# Patient Record
Sex: FEMALE | Race: WHITE | NOT HISPANIC OR LATINO
[De-identification: names, ages, dates, MRNs, and addresses within clinical notes are randomized per-mention and may not be internally consistent; named-entity substitution may affect disease eponyms.]

---

## 2017-02-25 ENCOUNTER — RX RENEWAL (OUTPATIENT)
Age: 27
End: 2017-02-25

## 2023-07-22 ENCOUNTER — LABORATORY RESULT (OUTPATIENT)
Age: 33
End: 2023-07-22

## 2023-07-22 ENCOUNTER — ASOB RESULT (OUTPATIENT)
Age: 33
End: 2023-07-22

## 2023-07-22 ENCOUNTER — APPOINTMENT (OUTPATIENT)
Dept: ANTEPARTUM | Facility: CLINIC | Age: 33
End: 2023-07-22
Payer: COMMERCIAL

## 2023-07-22 PROCEDURE — 76813 OB US NUCHAL MEAS 1 GEST: CPT

## 2023-07-22 PROCEDURE — 76801 OB US < 14 WKS SINGLE FETUS: CPT

## 2023-07-22 PROCEDURE — 36416 COLLJ CAPILLARY BLOOD SPEC: CPT

## 2023-09-05 ENCOUNTER — ASOB RESULT (OUTPATIENT)
Age: 33
End: 2023-09-05

## 2023-09-05 ENCOUNTER — APPOINTMENT (OUTPATIENT)
Dept: ANTEPARTUM | Facility: CLINIC | Age: 33
End: 2023-09-05
Payer: COMMERCIAL

## 2023-09-05 PROCEDURE — 76805 OB US >/= 14 WKS SNGL FETUS: CPT

## 2023-09-19 ENCOUNTER — TRANSCRIPTION ENCOUNTER (OUTPATIENT)
Age: 33
End: 2023-09-19

## 2024-01-09 ENCOUNTER — OUTPATIENT (OUTPATIENT)
Dept: INPATIENT UNIT | Facility: HOSPITAL | Age: 34
LOS: 1 days | Discharge: HOME | End: 2024-01-09

## 2024-01-09 ENCOUNTER — INPATIENT (INPATIENT)
Facility: HOSPITAL | Age: 34
LOS: 2 days | Discharge: HOME | End: 2024-01-12
Attending: STUDENT IN AN ORGANIZED HEALTH CARE EDUCATION/TRAINING PROGRAM | Admitting: STUDENT IN AN ORGANIZED HEALTH CARE EDUCATION/TRAINING PROGRAM

## 2024-01-09 ENCOUNTER — ASOB RESULT (OUTPATIENT)
Age: 34
End: 2024-01-09

## 2024-01-09 ENCOUNTER — APPOINTMENT (OUTPATIENT)
Dept: ANTEPARTUM | Facility: CLINIC | Age: 34
End: 2024-01-09
Payer: COMMERCIAL

## 2024-01-09 VITALS — TEMPERATURE: 98 F | DIASTOLIC BLOOD PRESSURE: 63 MMHG | HEART RATE: 106 BPM | SYSTOLIC BLOOD PRESSURE: 111 MMHG

## 2024-01-09 VITALS
SYSTOLIC BLOOD PRESSURE: 365 MMHG | DIASTOLIC BLOOD PRESSURE: 83 MMHG | RESPIRATION RATE: 100 BRPM | TEMPERATURE: 98 F | HEART RATE: 43 BPM

## 2024-01-09 VITALS
RESPIRATION RATE: 17 BRPM | HEART RATE: 107 BPM | SYSTOLIC BLOOD PRESSURE: 121 MMHG | TEMPERATURE: 98 F | DIASTOLIC BLOOD PRESSURE: 68 MMHG

## 2024-01-09 DIAGNOSIS — Z98.890 OTHER SPECIFIED POSTPROCEDURAL STATES: Chronic | ICD-10-CM

## 2024-01-09 DIAGNOSIS — O26.899 OTHER SPECIFIED PREGNANCY RELATED CONDITIONS, UNSPECIFIED TRIMESTER: ICD-10-CM

## 2024-01-09 LAB
HCT VFR BLD CALC: 38.8 % — SIGNIFICANT CHANGE UP (ref 34.5–45)
HCT VFR BLD CALC: 38.8 % — SIGNIFICANT CHANGE UP (ref 34.5–45)
HGB BLD-MCNC: 13.1 G/DL — SIGNIFICANT CHANGE UP (ref 11.5–15.5)
HGB BLD-MCNC: 13.1 G/DL — SIGNIFICANT CHANGE UP (ref 11.5–15.5)
IANC: 20.72 K/UL — HIGH (ref 1.8–7.4)
IANC: 20.72 K/UL — HIGH (ref 1.8–7.4)
MCHC RBC-ENTMCNC: 29.8 PG — SIGNIFICANT CHANGE UP (ref 27–34)
MCHC RBC-ENTMCNC: 29.8 PG — SIGNIFICANT CHANGE UP (ref 27–34)
MCHC RBC-ENTMCNC: 33.8 GM/DL — SIGNIFICANT CHANGE UP (ref 32–36)
MCHC RBC-ENTMCNC: 33.8 GM/DL — SIGNIFICANT CHANGE UP (ref 32–36)
MCV RBC AUTO: 88.2 FL — SIGNIFICANT CHANGE UP (ref 80–100)
MCV RBC AUTO: 88.2 FL — SIGNIFICANT CHANGE UP (ref 80–100)
PLATELET # BLD AUTO: 281 K/UL — SIGNIFICANT CHANGE UP (ref 150–400)
PLATELET # BLD AUTO: 281 K/UL — SIGNIFICANT CHANGE UP (ref 150–400)
RBC # BLD: 4.4 M/UL — SIGNIFICANT CHANGE UP (ref 3.8–5.2)
RBC # BLD: 4.4 M/UL — SIGNIFICANT CHANGE UP (ref 3.8–5.2)
RBC # FLD: 12.3 % — SIGNIFICANT CHANGE UP (ref 10.3–14.5)
RBC # FLD: 12.3 % — SIGNIFICANT CHANGE UP (ref 10.3–14.5)
WBC # BLD: 24.46 K/UL — HIGH (ref 3.8–10.5)
WBC # BLD: 24.46 K/UL — HIGH (ref 3.8–10.5)
WBC # FLD AUTO: 24.46 K/UL — HIGH (ref 3.8–10.5)
WBC # FLD AUTO: 24.46 K/UL — HIGH (ref 3.8–10.5)

## 2024-01-09 PROCEDURE — 76819 FETAL BIOPHYS PROFIL W/O NST: CPT | Mod: 26

## 2024-01-09 RX ORDER — CITRIC ACID/SODIUM CITRATE 300-500 MG
15 SOLUTION, ORAL ORAL EVERY 6 HOURS
Refills: 0 | Status: DISCONTINUED | OUTPATIENT
Start: 2024-01-09 | End: 2024-01-10

## 2024-01-09 RX ORDER — SODIUM CHLORIDE 9 MG/ML
1000 INJECTION, SOLUTION INTRAVENOUS ONCE
Refills: 0 | Status: DISCONTINUED | OUTPATIENT
Start: 2024-01-09 | End: 2024-01-10

## 2024-01-09 RX ORDER — OXYTOCIN 10 UNIT/ML
333.33 VIAL (ML) INJECTION
Qty: 20 | Refills: 0 | Status: DISCONTINUED | OUTPATIENT
Start: 2024-01-09 | End: 2024-01-10

## 2024-01-09 RX ORDER — SODIUM CHLORIDE 9 MG/ML
500 INJECTION, SOLUTION INTRAVENOUS ONCE
Refills: 0 | Status: DISCONTINUED | OUTPATIENT
Start: 2024-01-09 | End: 2024-01-10

## 2024-01-09 RX ORDER — CHLORHEXIDINE GLUCONATE 213 G/1000ML
1 SOLUTION TOPICAL DAILY
Refills: 0 | Status: DISCONTINUED | OUTPATIENT
Start: 2024-01-09 | End: 2024-01-10

## 2024-01-09 RX ORDER — SODIUM CHLORIDE 9 MG/ML
1000 INJECTION, SOLUTION INTRAVENOUS
Refills: 0 | Status: DISCONTINUED | OUTPATIENT
Start: 2024-01-09 | End: 2024-01-10

## 2024-01-09 NOTE — OB RN TRIAGE NOTE - FALL HARM RISK - UNIVERSAL INTERVENTIONS
Bed in lowest position, wheels locked, appropriate side rails in place/Call bell, personal items and telephone in reach/Instruct patient to call for assistance before getting out of bed or chair/Non-slip footwear when patient is out of bed/[place] to call system/Physically safe environment - no spills, clutter or unnecessary equipment/Purposeful Proactive Rounding/Room/bathroom lighting operational, light cord in reach Bed in lowest position, wheels locked, appropriate side rails in place/Call bell, personal items and telephone in reach/Instruct patient to call for assistance before getting out of bed or chair/Non-slip footwear when patient is out of bed/[place] to call system/Physically safe environment - no spills, clutter or unnecessary equipment/Purposeful Proactive Rounding/Room/bathroom lighting operational, light cord in reach

## 2024-01-09 NOTE — OB PROVIDER TRIAGE NOTE - ADDITIONAL INSTRUCTIONS
32yo  at 37+4 presenting to ob triage for rule out labor.  Pt. to go home and ambulate, educated on nipple stimulation, stretching and other techniques to assist in anticipated cervical change, pt. verbalizes understanding.  Pt. educated on trigger warnings on when to return to ob triage, pt. verbalizes understanding.  Pt. clinically stable for discharge home.  Discussed with Dr. Giron

## 2024-01-09 NOTE — OB PROVIDER H&P - ASSESSMENT
32yo  at 37+4 labor and rupture of membrane.  2315 discuss with Dr. Hogue  Patient admitted for early labor and rupture of membranes  For expectant management at this time  For epi PRN  routine orders  meds ordered  GBS negative  consents signed by patient.  2340 discuss with Dr. Pizarro PGY-3    ESTHER Martinez NP 34yo  at 37+4 labor and rupture of membrane.  2315 discuss with Dr. Hogue  Patient admitted for early labor and rupture of membranes  For expectant management at this time  For epi PRN  routine orders  meds ordered  GBS negative  consents signed by patient.  2340 discuss with Dr. Pizarro PGY-3    ESTHER Martinez NP

## 2024-01-09 NOTE — OB PROVIDER TRIAGE NOTE - HISTORY OF PRESENT ILLNESS
34yo  at 37+4 presenting to ob triage for complaint of contractions that had increase to 10.10 since 9PM and vaginal leakage of fluid since . 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

## 2024-01-09 NOTE — OB PROVIDER TRIAGE NOTE - HISTORY OF PRESENT ILLNESS
32yo  at 37+4 presenting to ob triage for rule out labor. Pt. denies decreased fetal movement, visualization of blood, fluid, fever, cough, chills, so, palpitations, n/v.

## 2024-01-09 NOTE — OB PROVIDER TRIAGE NOTE - NSOBPROVIDERNOTE_OBGYN_ALL_OB_FT
32yo  at 37+4 labor and rupture of membrane.  [id number] discuss with Dr. Hogue  Patient admitted for early labor and rupture of membranes  For expectant management at this time  For epi PRN  routine orders  meds ordered  GBS negative  consents signed by patient.    ESTHER Martinez NP 32yo  at 37+4 labor and rupture of membrane.  9513 discuss with Dr. Hogue  Patient admitted for early labor and rupture of membranes  For expectant management at this time  For epi PRN  routine orders  meds ordered  GBS negative  consents signed by patient.    ESTHER Martinez NP

## 2024-01-09 NOTE — OB RN TRIAGE NOTE - NSICDXPASTSURGICALHX_GEN_ALL_CORE_FT
PAST SURGICAL HISTORY:  H/O breast biopsy     History of arthroscopy of hip     History of surgical removal of pilonidal cyst

## 2024-01-09 NOTE — OB RN TRIAGE NOTE - FALL HARM RISK - UNIVERSAL INTERVENTIONS
Bed in lowest position, wheels locked, appropriate side rails in place/Call bell, personal items and telephone in reach/Instruct patient to call for assistance before getting out of bed or chair/Non-slip footwear when patient is out of bed/[place] to call system/Physically safe environment - no spills, clutter or unnecessary equipment/Purposeful Proactive Rounding/Room/bathroom lighting operational, light cord in reach Bed in lowest position, wheels locked, appropriate side rails in place/Call bell, personal items and telephone in reach/Instruct patient to call for assistance before getting out of bed or chair/Non-slip footwear when patient is out of bed/[place] to call system/Physically safe environment - no spills, clutter or unnecessary equipment/Purposeful Proactive Rounding/Room/bathroom lighting operational, light cord in reach

## 2024-01-09 NOTE — OB PROVIDER TRIAGE NOTE - NSOBPROVIDERNOTE_OBGYN_ALL_OB_FT
32yo  at 37+4 presenting to ob triage for rule out labor.  NST- reactive, moderate variability  BPP:8/8 GIULIA:13.83  TAUS: vertex, cephalic Placenta: posterior  Contractions 3-4/10 Pain 7/10  GBS negative 23 (physical copy)      Plan:  Pt. to go home and ambulate, educated on nipple stimulation, stretching and other techniques to assist in anticipated cervical change, pt. verbalizes understanding.  Pt. educated on trigger warnings on when to return to ob triage, pt. verbalizes understanding.  Pt. clinically stable for discharge home.  Discussed with Dr. Giron

## 2024-01-09 NOTE — OB RN TRIAGE NOTE - NS_TRIAGETIMEOFMEDICALSCREENING_OBGYN_ALL_OB_DT
Addended by: CARLOS GUEVARA on: 6/6/2019 09:49 AM     Modules accepted: Orders    
09-Jan-2024 22:53

## 2024-01-09 NOTE — OB PROVIDER TRIAGE NOTE - NSHPPHYSICALEXAM_GEN_ALL_CORE
Vital Signs Last 24 Hrs  T(C): 36.5 (09 Jan 2024 22:18), Max: 36.5 (09 Jan 2024 10:42)  T(F): 97.7 (09 Jan 2024 22:18), Max: 97.7 (09 Jan 2024 10:42)  HR: 77 (09 Jan 2024 22:20) (43 - 107)  BP: 118/56 (09 Jan 2024 22:20) (111/63 - 365/83)  BP(mean): --  RR: 100 (09 Jan 2024 22:00) (17 - 100)  SpO2: --    Gen: NAD  Head: NC/AT  Cardio: S1S2+, RRR  Resp: CTABL, no wheezing  Abdomen: Soft, NT/ND, BS+  Extremities: No LE edema bilaterally    NST and BPP done to assess fetal surveillance and results as follows:  NST-->FHR: 145 HR baseline, moderate variability, accelerations present, no decelerations, reactive NST.  [place]: Contractions present, regular  saved in ASOB- TAUS- cephalic presentation, anterior placenta M-mode 143bpm  SSE pooling noted clear, nitrazine positive, FERN positive. No active lesions noted internally, externally, on perineum or rectum.  SVE: 4.5/70/-3 Vital Signs Last 24 Hrs  T(C): 36.5 (09 Jan 2024 22:18), Max: 36.5 (09 Jan 2024 10:42)  T(F): 97.7 (09 Jan 2024 22:18), Max: 97.7 (09 Jan 2024 10:42)  HR: 77 (09 Jan 2024 22:20) (43 - 107)  BP: 118/56 (09 Jan 2024 22:20) (111/63 - 365/83)  BP(mean): --  RR: 100 (09 Jan 2024 22:00) (17 - 100)  SpO2: --    Gen: NAD  Head: NC/AT  Cardio: S1S2+, RRR  Resp: CTABL, no wheezing  Abdomen: Soft, NT/ND, BS+  Extremities: No LE edema bilaterally    NST and BPP done to assess fetal surveillance and results as follows:  NST-->FHR: 145 HR baseline, moderate variability, accelerations present, no decelerations, reactive NST.  [place]: Contractions present, regular  saved in ASOB- TAUS- cephalic presentation, anterior placenta M-mode 143bpm  SSE pooling noted clear, nitrazine positive, FERN positive. No active lesions noted internally, externally, on perineum or rectum.  SVE: 4.5/70/-3

## 2024-01-09 NOTE — OB PROVIDER H&P - NSLOWPPHRISK_OBGYN_A_OB
No previous uterine incision/Jean Pregnancy/Less than or equal to 4 previous vaginal births/No known bleeding disorder/No history of postpartum hemorrhage/No other PPH risks indicated

## 2024-01-09 NOTE — OB PROVIDER H&P - NSHPPHYSICALEXAM_GEN_ALL_CORE
Vital Signs Last 24 Hrs  T(C): 36.5 (09 Jan 2024 22:18), Max: 36.5 (09 Jan 2024 10:42)  T(F): 97.7 (09 Jan 2024 22:18), Max: 97.7 (09 Jan 2024 10:42)  HR: 77 (09 Jan 2024 22:20) (43 - 107)  BP: 118/56 (09 Jan 2024 22:20) (111/63 - 365/83)  BP(mean): --  RR: 100 (09 Jan 2024 22:00) (17 - 100)  SpO2: --    Gen: NAD  Head: NC/AT  Cardio: S1S2+, RRR  Resp: CTABL, no wheezing  Abdomen: Soft, NT/ND, BS+  Extremities: No LE edema bilaterally    NST and BPP done to assess fetal surveillance and results as follows:  NST-->FHR: 145 HR baseline, moderate variability, accelerations present, no decelerations, reactive NST.  [place]: Contractions present, regular  saved in ASOB- TAUS- cephalic presentation, anterior placenta M-mode 143bpm  SSE pooling noted clear, nitrazine positive, FERN positive. No active lesions noted internally, externally, on perineum or rectum.  SVE: 4.5/70/-3 Vital Signs Last 24 Hrs  T(C): 36.5 (09 Jan 2024 22:18), Max: 36.5 (09 Jan 2024 10:42)  T(F): 97.7 (09 Jan 2024 22:18), Max: 97.7 (09 Jan 2024 10:42)  HR: 77 (09 Jan 2024 22:20) (43 - 107)  BP: 118/56 (09 Jan 2024 22:20) (111/63 - 365/83)  BP(mean): --  RR: 100 (09 Jan 2024 22:00) (17 - 100)  SpO2: --    Gen: NAD  Head: NC/AT  Cardio: S1S2+, RRR  Resp: CTABL, no wheezing  Abdomen: Soft, NT/ND, BS+  Extremities: No LE edema bilaterally    NST and BPP done to assess fetal surveillance and results as follows:  NST-->FHR: 145 HR baseline, moderate variability, accelerations present, no decelerations, reactive NST.  [place]: Contractions present, regular  saved in ASOB- TAUS- cephalic presentation, anterior placenta M-mode 143bpm  SSE pooling noted clear, nitrazine positive, FERN positive. No active lesions noted internally, externally, on perineum or rectum.  SVE: 4.5/70/-3

## 2024-01-10 ENCOUNTER — TRANSCRIPTION ENCOUNTER (OUTPATIENT)
Age: 34
End: 2024-01-10

## 2024-01-10 DIAGNOSIS — O42.10 PREMATURE RUPTURE OF MEMBRANES, ONSET OF LABOR MORE THAN 24 HOURS FOLLOWING RUPTURE, UNSPECIFIED WEEKS OF GESTATION: ICD-10-CM

## 2024-01-10 PROBLEM — B00.9 HERPESVIRAL INFECTION, UNSPECIFIED: Chronic | Status: ACTIVE | Noted: 2024-01-09

## 2024-01-10 LAB
ANISOCYTOSIS BLD QL: SLIGHT — SIGNIFICANT CHANGE UP
ANISOCYTOSIS BLD QL: SLIGHT — SIGNIFICANT CHANGE UP
BASOPHILS # BLD AUTO: 0 K/UL — SIGNIFICANT CHANGE UP (ref 0–0.2)
BASOPHILS # BLD AUTO: 0 K/UL — SIGNIFICANT CHANGE UP (ref 0–0.2)
BASOPHILS NFR BLD AUTO: 0 % — SIGNIFICANT CHANGE UP (ref 0–2)
BASOPHILS NFR BLD AUTO: 0 % — SIGNIFICANT CHANGE UP (ref 0–2)
BLD GP AB SCN SERPL QL: NEGATIVE — SIGNIFICANT CHANGE UP
BLD GP AB SCN SERPL QL: NEGATIVE — SIGNIFICANT CHANGE UP
EOSINOPHIL # BLD AUTO: 0 K/UL — SIGNIFICANT CHANGE UP (ref 0–0.5)
EOSINOPHIL # BLD AUTO: 0 K/UL — SIGNIFICANT CHANGE UP (ref 0–0.5)
EOSINOPHIL NFR BLD AUTO: 0 % — SIGNIFICANT CHANGE UP (ref 0–6)
EOSINOPHIL NFR BLD AUTO: 0 % — SIGNIFICANT CHANGE UP (ref 0–6)
LYMPHOCYTES # BLD AUTO: 0.91 K/UL — LOW (ref 1–3.3)
LYMPHOCYTES # BLD AUTO: 0.91 K/UL — LOW (ref 1–3.3)
LYMPHOCYTES # BLD AUTO: 3.7 % — LOW (ref 13–44)
LYMPHOCYTES # BLD AUTO: 3.7 % — LOW (ref 13–44)
MACROCYTES BLD QL: SLIGHT — SIGNIFICANT CHANGE UP
MACROCYTES BLD QL: SLIGHT — SIGNIFICANT CHANGE UP
MANUAL SMEAR VERIFICATION: SIGNIFICANT CHANGE UP
MANUAL SMEAR VERIFICATION: SIGNIFICANT CHANGE UP
MONOCYTES # BLD AUTO: 0.88 K/UL — SIGNIFICANT CHANGE UP (ref 0–0.9)
MONOCYTES # BLD AUTO: 0.88 K/UL — SIGNIFICANT CHANGE UP (ref 0–0.9)
MONOCYTES NFR BLD AUTO: 3.6 % — SIGNIFICANT CHANGE UP (ref 2–14)
MONOCYTES NFR BLD AUTO: 3.6 % — SIGNIFICANT CHANGE UP (ref 2–14)
NEUTROPHILS # BLD AUTO: 22.45 K/UL — HIGH (ref 1.8–7.4)
NEUTROPHILS # BLD AUTO: 22.45 K/UL — HIGH (ref 1.8–7.4)
NEUTROPHILS NFR BLD AUTO: 90.9 % — HIGH (ref 43–77)
NEUTROPHILS NFR BLD AUTO: 90.9 % — HIGH (ref 43–77)
NEUTS BAND # BLD: 0.9 % — SIGNIFICANT CHANGE UP (ref 0–6)
NEUTS BAND # BLD: 0.9 % — SIGNIFICANT CHANGE UP (ref 0–6)
PLAT MORPH BLD: NORMAL — SIGNIFICANT CHANGE UP
PLAT MORPH BLD: NORMAL — SIGNIFICANT CHANGE UP
PLATELET COUNT - ESTIMATE: NORMAL — SIGNIFICANT CHANGE UP
PLATELET COUNT - ESTIMATE: NORMAL — SIGNIFICANT CHANGE UP
POLYCHROMASIA BLD QL SMEAR: SLIGHT — SIGNIFICANT CHANGE UP
POLYCHROMASIA BLD QL SMEAR: SLIGHT — SIGNIFICANT CHANGE UP
RBC BLD AUTO: ABNORMAL
RBC BLD AUTO: ABNORMAL
RH IG SCN BLD-IMP: POSITIVE — SIGNIFICANT CHANGE UP
RUBV IGG SER-ACNC: 1.2 INDEX — SIGNIFICANT CHANGE UP
RUBV IGG SER-ACNC: 1.2 INDEX — SIGNIFICANT CHANGE UP
RUBV IGG SER-IMP: POSITIVE — SIGNIFICANT CHANGE UP
RUBV IGG SER-IMP: POSITIVE — SIGNIFICANT CHANGE UP
T PALLIDUM AB TITR SER: NEGATIVE — SIGNIFICANT CHANGE UP
T PALLIDUM AB TITR SER: NEGATIVE — SIGNIFICANT CHANGE UP
VARIANT LYMPHS # BLD: 0.9 % — SIGNIFICANT CHANGE UP (ref 0–6)
VARIANT LYMPHS # BLD: 0.9 % — SIGNIFICANT CHANGE UP (ref 0–6)

## 2024-01-10 RX ORDER — ACETAMINOPHEN 500 MG
975 TABLET ORAL
Refills: 0 | Status: DISCONTINUED | OUTPATIENT
Start: 2024-01-10 | End: 2024-01-12

## 2024-01-10 RX ORDER — MAGNESIUM HYDROXIDE 400 MG/1
30 TABLET, CHEWABLE ORAL
Refills: 0 | Status: DISCONTINUED | OUTPATIENT
Start: 2024-01-10 | End: 2024-01-12

## 2024-01-10 RX ORDER — OXYTOCIN 10 UNIT/ML
2 VIAL (ML) INJECTION
Qty: 30 | Refills: 0 | Status: DISCONTINUED | OUTPATIENT
Start: 2024-01-10 | End: 2024-01-12

## 2024-01-10 RX ORDER — OXYTOCIN 10 UNIT/ML
41.67 VIAL (ML) INJECTION
Qty: 20 | Refills: 0 | Status: DISCONTINUED | OUTPATIENT
Start: 2024-01-10 | End: 2024-01-12

## 2024-01-10 RX ORDER — DIBUCAINE 1 %
1 OINTMENT (GRAM) RECTAL EVERY 6 HOURS
Refills: 0 | Status: DISCONTINUED | OUTPATIENT
Start: 2024-01-10 | End: 2024-01-12

## 2024-01-10 RX ORDER — AER TRAVELER 0.5 G/1
1 SOLUTION RECTAL; TOPICAL
Qty: 0 | Refills: 0 | DISCHARGE
Start: 2024-01-10

## 2024-01-10 RX ORDER — AER TRAVELER 0.5 G/1
1 SOLUTION RECTAL; TOPICAL EVERY 4 HOURS
Refills: 0 | Status: DISCONTINUED | OUTPATIENT
Start: 2024-01-10 | End: 2024-01-12

## 2024-01-10 RX ORDER — OXYCODONE HYDROCHLORIDE 5 MG/1
5 TABLET ORAL ONCE
Refills: 0 | Status: DISCONTINUED | OUTPATIENT
Start: 2024-01-10 | End: 2024-01-12

## 2024-01-10 RX ORDER — IBUPROFEN 200 MG
600 TABLET ORAL EVERY 6 HOURS
Refills: 0 | Status: COMPLETED | OUTPATIENT
Start: 2024-01-10 | End: 2024-12-08

## 2024-01-10 RX ORDER — DIBUCAINE 1 %
1 OINTMENT (GRAM) RECTAL
Qty: 0 | Refills: 0 | DISCHARGE
Start: 2024-01-10

## 2024-01-10 RX ORDER — TETANUS TOXOID, REDUCED DIPHTHERIA TOXOID AND ACELLULAR PERTUSSIS VACCINE, ADSORBED 5; 2.5; 8; 8; 2.5 [IU]/.5ML; [IU]/.5ML; UG/.5ML; UG/.5ML; UG/.5ML
0.5 SUSPENSION INTRAMUSCULAR ONCE
Refills: 0 | Status: DISCONTINUED | OUTPATIENT
Start: 2024-01-10 | End: 2024-01-12

## 2024-01-10 RX ORDER — ACETAMINOPHEN 500 MG
3 TABLET ORAL
Qty: 0 | Refills: 0 | DISCHARGE
Start: 2024-01-10

## 2024-01-10 RX ORDER — KETOROLAC TROMETHAMINE 30 MG/ML
30 SYRINGE (ML) INJECTION ONCE
Refills: 0 | Status: DISCONTINUED | OUTPATIENT
Start: 2024-01-10 | End: 2024-01-10

## 2024-01-10 RX ORDER — SODIUM CHLORIDE 9 MG/ML
3 INJECTION INTRAMUSCULAR; INTRAVENOUS; SUBCUTANEOUS EVERY 8 HOURS
Refills: 0 | Status: DISCONTINUED | OUTPATIENT
Start: 2024-01-10 | End: 2024-01-12

## 2024-01-10 RX ORDER — SIMETHICONE 80 MG/1
80 TABLET, CHEWABLE ORAL EVERY 4 HOURS
Refills: 0 | Status: DISCONTINUED | OUTPATIENT
Start: 2024-01-10 | End: 2024-01-12

## 2024-01-10 RX ORDER — BENZOCAINE 10 %
1 GEL (GRAM) MUCOUS MEMBRANE EVERY 6 HOURS
Refills: 0 | Status: DISCONTINUED | OUTPATIENT
Start: 2024-01-10 | End: 2024-01-12

## 2024-01-10 RX ORDER — VALACYCLOVIR 500 MG/1
1 TABLET, FILM COATED ORAL
Refills: 0 | DISCHARGE

## 2024-01-10 RX ORDER — DIPHENHYDRAMINE HCL 50 MG
25 CAPSULE ORAL EVERY 6 HOURS
Refills: 0 | Status: DISCONTINUED | OUTPATIENT
Start: 2024-01-10 | End: 2024-01-12

## 2024-01-10 RX ORDER — IBUPROFEN 200 MG
1 TABLET ORAL
Qty: 0 | Refills: 0 | DISCHARGE
Start: 2024-01-10

## 2024-01-10 RX ORDER — LANOLIN
1 OINTMENT (GRAM) TOPICAL EVERY 6 HOURS
Refills: 0 | Status: DISCONTINUED | OUTPATIENT
Start: 2024-01-10 | End: 2024-01-12

## 2024-01-10 RX ORDER — SODIUM CHLORIDE 9 MG/ML
1000 INJECTION INTRAMUSCULAR; INTRAVENOUS; SUBCUTANEOUS
Refills: 0 | Status: DISCONTINUED | OUTPATIENT
Start: 2024-01-10 | End: 2024-01-12

## 2024-01-10 RX ORDER — IBUPROFEN 200 MG
600 TABLET ORAL EVERY 6 HOURS
Refills: 0 | Status: DISCONTINUED | OUTPATIENT
Start: 2024-01-10 | End: 2024-01-12

## 2024-01-10 RX ORDER — OXYCODONE HYDROCHLORIDE 5 MG/1
5 TABLET ORAL
Refills: 0 | Status: DISCONTINUED | OUTPATIENT
Start: 2024-01-10 | End: 2024-01-12

## 2024-01-10 RX ORDER — SODIUM CHLORIDE 9 MG/ML
500 INJECTION INTRAMUSCULAR; INTRAVENOUS; SUBCUTANEOUS ONCE
Refills: 0 | Status: COMPLETED | OUTPATIENT
Start: 2024-01-10 | End: 2024-01-10

## 2024-01-10 RX ORDER — HYDROCORTISONE 1 %
1 OINTMENT (GRAM) TOPICAL EVERY 6 HOURS
Refills: 0 | Status: DISCONTINUED | OUTPATIENT
Start: 2024-01-10 | End: 2024-01-12

## 2024-01-10 RX ORDER — PRAMOXINE HYDROCHLORIDE 150 MG/15G
1 AEROSOL, FOAM RECTAL EVERY 4 HOURS
Refills: 0 | Status: DISCONTINUED | OUTPATIENT
Start: 2024-01-10 | End: 2024-01-12

## 2024-01-10 RX ADMIN — Medication 600 MILLIGRAM(S): at 15:39

## 2024-01-10 RX ADMIN — SODIUM CHLORIDE 1000 MILLILITER(S): 9 INJECTION INTRAMUSCULAR; INTRAVENOUS; SUBCUTANEOUS at 02:45

## 2024-01-10 RX ADMIN — Medication 2 MILLIUNIT(S)/MIN: at 01:28

## 2024-01-10 RX ADMIN — Medication 1 TABLET(S): at 11:50

## 2024-01-10 RX ADMIN — CHLORHEXIDINE GLUCONATE 1 APPLICATION(S): 213 SOLUTION TOPICAL at 05:15

## 2024-01-10 RX ADMIN — Medication 15 MILLILITER(S): at 07:33

## 2024-01-10 RX ADMIN — Medication 600 MILLIGRAM(S): at 15:09

## 2024-01-10 RX ADMIN — Medication 975 MILLIGRAM(S): at 18:30

## 2024-01-10 RX ADMIN — Medication 600 MILLIGRAM(S): at 23:45

## 2024-01-10 RX ADMIN — Medication 0.25 MILLIGRAM(S): at 02:57

## 2024-01-10 RX ADMIN — Medication 975 MILLIGRAM(S): at 19:00

## 2024-01-10 RX ADMIN — SODIUM CHLORIDE 3 MILLILITER(S): 9 INJECTION INTRAMUSCULAR; INTRAVENOUS; SUBCUTANEOUS at 22:00

## 2024-01-10 RX ADMIN — Medication 1 APPLICATION(S): at 11:49

## 2024-01-10 RX ADMIN — Medication 125 MILLIUNIT(S)/MIN: at 09:21

## 2024-01-10 RX ADMIN — Medication 30 MILLIGRAM(S): at 11:20

## 2024-01-10 RX ADMIN — Medication 600 MILLIGRAM(S): at 21:41

## 2024-01-10 RX ADMIN — SODIUM CHLORIDE 3 MILLILITER(S): 9 INJECTION INTRAMUSCULAR; INTRAVENOUS; SUBCUTANEOUS at 14:00

## 2024-01-10 NOTE — DISCHARGE NOTE OB - PROVIDER TOKENS
PROVIDER:[TOKEN:[[id number]],ESTABLISHEDPATIENT:[T]] PROVIDER:[TOKEN:[[id number]],ESTABLISHEDPATIENT:[T]]

## 2024-01-10 NOTE — OB NEONATOLOGY/PEDIATRICIAN DELIVERY SUMMARY - NSPEDSNEONOTESA_OBGYN_ALL_OB_FT
37.5 wk male born SGA via VAVD to a 32 y/o  blood type O+ mother. Maternal history of HSV (No active lesions, on Valtrex ppx during pregnancy), and hip surgery. No significant prenatal history. Required amnioinfusion x1 on 1/10/24 @ 0245 for oligohydramnios. PNL HIV -/Hep B-/RPR non-reactive/Rubella Unknown (lab pending), GBS - on . SROM at 2108 on 24 with clear fluids. Baby emerged limp with 1 weak cry, then became apneic. Nicu resus called, and PPV started at 2 MOL after stimulation, suction, and repositioning did not induce spontaneous respirations. PPV (Max 5/20, 21%) continued x2 minutes until 4-5MOL when NICU arrived and baby was transitioned to CPAP. Received 5 minutes of CPAP (Max 5/21%). APGARS of 5/9. Passed meconium and voided in LDR.  Mom plans to initiate breastfeeding, declines Hep B vaccine, and consents to circ. Highest maternal temp 36.9C with EOS of 0.16 at birth. Admitted under Dr. Liu.    : 1/10/24  TOB: 0803  Birth weight: 2430g (SGA)    Physical Exam post resuscitation:  Gen: no acute distress, +grimace  HEENT:  questionable 3x3cm cephalohematoma at vacuum site on R parietal prominence, anterior fontanel open soft and flat, nondysmorphic facies, no cleft lip/palate, ears normal set, no ear pits or tags, nares clinically patent  Resp: Normal respiratory effort without grunting or retractions, good air entry b/l, clear to auscultation bilaterally  Cardio: Present S1/S2, regular rate and rhythm, no murmurs  Abd: soft, non tender, non distended, umbilical cord with 3 vessels  Neuro: +palmar and plantar grasp, +suck, +laya, normal tone  Extremities: negative garcia and ortolani maneuvers, moving all extremities, no clavicular crepitus or stepoff  Skin: pink, warm  Genitals: Normal male anatomy, testicles palpable in scrotum b/l, [place] 1, anus patent

## 2024-01-10 NOTE — DISCHARGE NOTE OB - MEDICATION SUMMARY - MEDICATIONS TO TAKE
I will START or STAY ON the medications listed below when I get home from the hospital:    ibuprofen 600 mg oral tablet  -- 1 tab(s) by mouth every 6 hours as needed for  moderate pain  -- Indication: For Pain    acetaminophen 325 mg oral tablet  -- 3 tab(s) by mouth every 6 hours as needed for  moderate pain  -- Indication: For Pain    dibucaine 1% topical ointment  -- 1 Apply on skin to affected area every 6 hours As needed Perineal discomfort  -- Indication: For vaginal pain    witch hazel 50% topical pad  -- 1 Apply on skin to affected area every 4 hours As needed Perineal discomfort  -- Indication: For vaginal pain

## 2024-01-10 NOTE — OB PROVIDER LABOR PROGRESS NOTE - NS_SUBJECTIVE/OBJECTIVE_OBGYN_ALL_OB_FT
At bedside due to deceleration
VE due to deceleration
At bedside due to intermittent late decelerations

## 2024-01-10 NOTE — OB PROVIDER LABOR PROGRESS NOTE - NS_OBIHIFHRDETAILS_OBGYN_ALL_OB_FT
130, moderate, no accel, 5 min deceleration to 70s
140, moderate, +accels, intermittent late decelerations
130, moderate, accels, decel to 70s

## 2024-01-10 NOTE — DISCHARGE NOTE OB - HOSPITAL COURSE
Vacuum Assisted Vaginal Delivery Note, cat 2 tracing, peds present at delivery    Patient fully dilated and pushing.  Due to persistent category 2 tracing the decision was made to procedure with operative vaginal delivery. The patient was informed. Verbal consent was given.     Position was OA. Vacuum was applied at +2 station, suction pressure at 500mm Hg initiated and gradual traction initiated with maternal pushing. Head delivered easily with 1 pull and 0 pop off. Vacuum suction was released and the vacuum removed from the fetal head. No nuchal cord noted. The anterior shoulder delivered easily with maternal expulsive efforts with the assistance of downward guidance. The posterior shoulder delivered with maternal expulsive efforts and upward guidance. The body of the fetus delivered spontaneously.    Delivery of a viable male infant. Cord clamped x 2 and cut. Infant was handed to awaiting pediatrician.  code was called. Apgar 5/8  Lidocaine was infiltrated into the vaginal tissue. Second degree laceration was repaired with 2-0 Chromic. Private cord collection performed, very short cord noted. the placenta delivered spontaneously intact.  Fundal massage was performed and the uterus was noted to have atony, vigorous bimanual massage given, clots removed, uterus noted to then be firm. IV oxytocin bolus infusing, Cytotec [place] given.   Excellent hemostasis achieved. Lap and sponge count correct. Vaginal vault free of sponges. Mother and baby to recovery in stable condition.     EBL: 425ml    Apgars: 5/8  Vacuum Assisted Vaginal Delivery Note, cat 2 tracing, peds present at delivery    Patient fully dilated and pushing.  Due to persistent category 2 tracing the decision was made to procedure with operative vaginal delivery. The patient was informed. Verbal consent was given.     Position was OA. Vacuum was applied at +2 station, suction pressure at 500mm Hg initiated and gradual traction initiated with maternal pushing. Head delivered easily with 1 pull and 0 pop off. Vacuum suction was released and the vacuum removed from the fetal head. No nuchal cord noted. The anterior shoulder delivered easily with maternal expulsive efforts with the assistance of downward guidance. The posterior shoulder delivered with maternal expulsive efforts and upward guidance. The body of the fetus delivered spontaneously.    Delivery of a viable male infant. Cord clamped x 2 and cut. Infant was handed to awaiting pediatrician.  code was called. Apgar 5/8  Lidocaine was infiltrated into the vaginal tissue. Second degree laceration was repaired with 2-0 Chromic. Private cord collection performed, very short cord noted. the placenta delivered spontaneously intact.  Fundal massage was performed and the uterus was noted to have atony, vigorous bimanual massage given, clots removed, uterus noted to then be firm. IV oxytocin bolus infusing, Cytotec [place] given.   Excellent hemostasis achieved. Lap and sponge count correct. Vaginal vault free of sponges. Mother and baby to recovery in stable condition.     EBL: 425ml    Apgars: 5/8

## 2024-01-10 NOTE — OB RN DELIVERY SUMMARY - BABY A: WEIGHT IN POUNDS (FROM GRAMS), DELIVERY
"Quinten Lemos  
5

## 2024-01-10 NOTE — CHART NOTE - NSCHARTNOTEFT_GEN_A_CORE
PTA performed with patient's nurse, charge nurse, chief resident, and second attending. Patient with Category 2 tracing, which has been intermittent, and responds to repositioning. VE 9/90/0, therefore will continue with expectant management (without pitocin) and resuscitative measures to achieve a vaginal delivery.
PTA performed with patient's nurse, charge nurse, chief resident, and second attending. Patient with Category 2 tracing, which has improved after repositioning, IV Fluids, Terbutaline, and amnioinfusion. At this time, patient with Category 1 Tracing. Will restart pitocin and continue to monitor closely.

## 2024-01-10 NOTE — OB RN DELIVERY SUMMARY - NSSELHIDDEN_OBGYN_ALL_OB_FT
[NS_DeliveryAttending1_OBGYN_ALL_OB_FT:[id number]],[NS_DeliveryAssist1_OBGYN_ALL_OB_FT:[id number]],[NS_DeliveryRN_OBGYN_ALL_OB_FT:[id number]] [NS_DeliveryAttending1_OBGYN_ALL_OB_FT:[id number]],[NS_DeliveryAssist1_OBGYN_ALL_OB_FT:[id number]],[NS_DeliveryRN_OBGYN_ALL_OB_FT:[id number]]

## 2024-01-10 NOTE — DISCHARGE NOTE OB - CARE PLAN
1 Principal Discharge DX:	Vacuum extractor delivery, delivered  Assessment and plan of treatment:	Routine pp care

## 2024-01-10 NOTE — OB RN PATIENT PROFILE - FALL HARM RISK - UNIVERSAL INTERVENTIONS
Bed in lowest position, wheels locked, appropriate side rails in place/Call bell, personal items and telephone in reach/Instruct patient to call for assistance before getting out of bed or chair/Non-slip footwear when patient is out of bed/[place] to call system/Physically safe environment - no spills, clutter or unnecessary equipment/Purposeful Proactive Rounding/Room/bathroom lighting operational, light cord in reach Bed in lowest position, wheels locked, appropriate side rails in place/Call bell, personal items and telephone in reach/Instruct patient to call for assistance before getting out of bed or chair/Non-slip footwear when patient is out of bed/[place] to call system/Physically safe environment - no spills, clutter or unnecessary equipment/Purposeful Proactive Rounding/Room/bathroom lighting operational, light cord in reach

## 2024-01-10 NOTE — OB RN DELIVERY SUMMARY - NS_GESTAGEATBIRTHA_OBGYN_ALL_OB_FT
Scribe Attestation (For Scribes USE Only)... Attending Attestation (For Attendings USE Only).../Scribe Attestation (For Scribes USE Only)... 37w5d

## 2024-01-10 NOTE — OB RN DELIVERY SUMMARY - NS_FETALMONITOR_OBGYN_ALL_OB
External [place]/Internal [place]/External FHR/Internal FHR External [place]/Internal [place]/External FHR/Internal FHR

## 2024-01-10 NOTE — OB RN PATIENT PROFILE - FUNCTIONAL ASSESSMENT - BASIC MOBILITY 6.
4-calculated by average /Not able to assess (calculate score using [facility] averaging method) 4-calculated by average /Not able to assess (calculate score using [facility] averaging method)

## 2024-01-10 NOTE — DISCHARGE NOTE OB - CARE PROVIDER_API CALL
Cyndie Allen  Obstetrics and Gynecology  [address] [place] [id number]  Phone: [phone]  Fax: [phone]  Established Patient  Follow Up Time:    Cyndie Allen  Obstetrics and Gynecology  [address] [place] [id number]  Phone: [phone]  Fax: [phone]  Established Patient  Follow Up Time:

## 2024-01-10 NOTE — OB RN DELIVERY SUMMARY - NS_GENERALBABYACOMMENTA_OBGYN_ALL_OB_FT
skin to skin delayed due to  blue and apneic immediately after delivery despite stimulation and bulb suctioning by RN while  on mother's chest. [place] brought to warmer for resuscitation and nicu resus team called for assistance  skin to skin delayed due to  blue and apneic immediately after delivery despite stimulation and bulb suctioning by RN while  on mother's chest. [place] brought to warmer for resuscitation and nicu resus team called for assistance

## 2024-01-10 NOTE — OB PROVIDER LABOR PROGRESS NOTE - ASSESSMENT
- pitocin paused  - LR bolus running   - pt repositioned   - making slight cervical change   - IUPC placed, ISE placed   - FH returning to baseline, but repetitive decelerations with each contraction   - amnioinfusion started   - terbutaline x1 given     Jyoti Pizarro, PGY3  d/w Dr. Solorzano

## 2024-01-10 NOTE — DISCHARGE NOTE OB - MEDICATION SUMMARY - MEDICATIONS TO STOP TAKING
I will STOP taking the medications listed below when I get home from the hospital:    valACYclovir 1 g oral tablet  -- 1 tab(s) orally   I will STOP taking the medications listed below when I get home from the hospital:  None

## 2024-01-10 NOTE — OB PROVIDER LABOR PROGRESS NOTE - ASSESSMENT
- pitocin discontinued  - LR bolus running   - pt with IUPC in place   - will defer 2nd dose of terbutaline as tracing improving with above measures  - do not plan to presume the pitocin as pt in a good labor pattern     Jyoti Pizarro, PGY3  d/w Dr. Sloorzano  - pitocin discontinued  - LR bolus running   - pt with IUPC in place   - will defer 2nd dose of terbutaline as tracing improving with above measures  - do not plan to presume the pitocin as pt in a good labor pattern     Jyoti Pizarro, PGY3  d/w Dr. Solorzano

## 2024-01-10 NOTE — OB PROVIDER DELIVERY SUMMARY - NSSELHIDDEN_OBGYN_ALL_OB_FT
[NS_DeliveryAttending1_OBGYN_ALL_OB_FT:[id number]],[NS_DeliveryAssist1_OBGYN_ALL_OB_FT:[id number]] [NS_DeliveryAttending1_OBGYN_ALL_OB_FT:[id number]],[NS_DeliveryAssist1_OBGYN_ALL_OB_FT:[id number]]

## 2024-01-10 NOTE — OB PROVIDER LABOR PROGRESS NOTE - ASSESSMENT
- pt is s/p epidural, comfortable with epi   - exam unchanged   - forebag ruptured, clear fluid   - can consider pitocin when FHT improves  - currently category II tracing, will continue repositioning and bolus  - can consider IUPC     Jyoti Pizarro, PGY3  d/w Dr. Solorzano

## 2024-01-10 NOTE — DISCHARGE NOTE OB - NS MD DC FALL RISK RISK
For information on Fall & Injury Prevention, visit: [email or web address]/[email or web address]-[email or web address]-mobility OR  [email or web address]/[email or web address]-[email or web address]-injury OR  https://www.cdc.gov/steadi/patient.html For information on Fall & Injury Prevention, visit: [email or web address]://www.[facility]/news/fall-prevention-protects-and-maintains-health-and-mobility OR  https://www.[facility]/news/fall-prevention-tips-to-avoid-injury OR  https://www.cdc.gov/steadi/patient.html

## 2024-01-10 NOTE — OB RN DELIVERY SUMMARY - NS_SEPSISRSKCALC_OBGYN_ALL_OB_FT
EOS calculated successfully. EOS Risk Factor: 0.5/1000 live births ([facility] national incidence); GA=37w5d; Temp=98.42; ROM=10.917; GBS='Negative'; Antibiotics='No antibiotics or any antibiotics < 2 hrs prior to birth'   EOS calculated successfully. EOS Risk Factor: 0.5/1000 live births ([facility] national incidence); GA=37w5d; Temp=98.42; ROM=10.917; GBS='Negative'; Antibiotics='No antibiotics or any antibiotics < 2 hrs prior to birth'

## 2024-01-10 NOTE — OB RN PATIENT PROFILE - NSICDXPASTMEDICALHX_GEN_ALL_CORE_FT
PAST MEDICAL HISTORY:  Herpes simplex      Action 4: Continue Detail Level: Zone Other Instructions: consider IM Kenalog, or addition of cyclosporine or MTX if continued disease activity at next OV

## 2024-01-10 NOTE — OB PROVIDER DELIVERY SUMMARY - NSPROVIDERDELIVERYNOTE_OBGYN_ALL_OB_FT
Vacuum Assisted Vaginal Delivery Note, cat 2 tracing, peds present at delivery    Patient fully dilated and pushing.  Due to persistent category 2 tracing the decision was made to procedure with operative vaginal delivery. The patient was informed. Verbal consent was given.     Position was OA. Vacuum was applied at +2 station, suction pressure at 500mm Hg initiated and gradual traction initiated with maternal pushing. Head delivered easily with 1 pull and 0 pop off. Vacuum suction was released and the vacuum removed from the fetal head. No nuchal cord noted. The anterior shoulder delivered easily with maternal expulsive efforts with the assistance of downward guidance. The posterior shoulder delivered with maternal expulsive efforts and upward guidance. The body of the fetus delivered spontaneously.    Delivery of a viable male infant. Cord clamped x 2 and cut. Infant was handed to awaiting pediatrician.  code was called. Apgar 5/8  Lidocaine was infiltrated into the vaginal tissue. Second degree laceration was repaired with 2-0 Chromic. Private cord collection performed, very short cord noted. the placenta delivered spontaneously intact.  Fundal massage was performed and the uterus was noted to have atony, vigorous bimanual massage given, clots removed, uterus noted to then be firm. IV oxytocin bolus infusing, Cytotec NC given.   Excellent hemostasis achieved. Lap and sponge count correct. Vaginal vault free of sponges. Mother and baby to recovery in stable condition.     EBL: 425ml    Apgars: 5/8       Attestation: I was present and scrubbed throughout the procedure participating in all key portions of the delivery Vacuum Assisted Vaginal Delivery Note, cat 2 tracing, peds present at delivery    Patient fully dilated and pushing.  Due to persistent category 2 tracing the decision was made to procedure with operative vaginal delivery. The patient was informed. Verbal consent was given.     Position was OA. Vacuum was applied at +2 station, suction pressure at 500mm Hg initiated and gradual traction initiated with maternal pushing. Head delivered easily with 1 pull and 0 pop off. Vacuum suction was released and the vacuum removed from the fetal head. No nuchal cord noted. The anterior shoulder delivered easily with maternal expulsive efforts with the assistance of downward guidance. The posterior shoulder delivered with maternal expulsive efforts and upward guidance. The body of the fetus delivered spontaneously.    Delivery of a viable male infant. Cord clamped x 2 and cut. Infant was handed to awaiting pediatrician.  code was called. Apgar 5/8  Lidocaine was infiltrated into the vaginal tissue. Second degree laceration was repaired with 2-0 Chromic. Private cord collection performed, very short cord noted. the placenta delivered spontaneously intact.  Fundal massage was performed and the uterus was noted to have atony, vigorous bimanual massage given, clots removed, uterus noted to then be firm. IV oxytocin bolus infusing, Cytotec [place] given.   Excellent hemostasis achieved. Lap and sponge count correct. Vaginal vault free of sponges. Mother and baby to recovery in stable condition.     EBL: 425ml    Apgars: 5/8       Attestation: I was present and scrubbed throughout the procedure participating in all key portions of the delivery

## 2024-01-10 NOTE — DISCHARGE NOTE OB - PATIENT PORTAL LINK FT
You can access the FollowMyHealth Patient Portal offered by [facility] by registering at the following website: [email or web address]. By joining [employer]’s FollowMyHealth portal, you will also be able to view your health information using other applications (apps) compatible with our system. You can access the FollowMyHealth Patient Portal offered by [facility] by registering at the following website: [email or web address]. By joining [employer]’s FollowMyHealth portal, you will also be able to view your health information using other applications (apps) compatible with our system.

## 2024-01-11 DIAGNOSIS — O47.1 FALSE LABOR AT OR AFTER 37 COMPLETED WEEKS OF GESTATION: ICD-10-CM

## 2024-01-11 DIAGNOSIS — Z3A.37 37 WEEKS GESTATION OF PREGNANCY: ICD-10-CM

## 2024-01-11 LAB
BASOPHILS # BLD AUTO: 0.08 K/UL — SIGNIFICANT CHANGE UP (ref 0–0.2)
BASOPHILS # BLD AUTO: 0.08 K/UL — SIGNIFICANT CHANGE UP (ref 0–0.2)
BASOPHILS NFR BLD AUTO: 0.4 % — SIGNIFICANT CHANGE UP (ref 0–2)
BASOPHILS NFR BLD AUTO: 0.4 % — SIGNIFICANT CHANGE UP (ref 0–2)
EOSINOPHIL # BLD AUTO: 0.24 K/UL — SIGNIFICANT CHANGE UP (ref 0–0.5)
EOSINOPHIL # BLD AUTO: 0.24 K/UL — SIGNIFICANT CHANGE UP (ref 0–0.5)
EOSINOPHIL NFR BLD AUTO: 1.2 % — SIGNIFICANT CHANGE UP (ref 0–6)
EOSINOPHIL NFR BLD AUTO: 1.2 % — SIGNIFICANT CHANGE UP (ref 0–6)
HCT VFR BLD CALC: 32.8 % — LOW (ref 34.5–45)
HCT VFR BLD CALC: 32.8 % — LOW (ref 34.5–45)
HGB BLD-MCNC: 10.7 G/DL — LOW (ref 11.5–15.5)
HGB BLD-MCNC: 10.7 G/DL — LOW (ref 11.5–15.5)
IANC: 15.12 K/UL — HIGH (ref 1.8–7.4)
IANC: 15.12 K/UL — HIGH (ref 1.8–7.4)
IMM GRANULOCYTES NFR BLD AUTO: 0.6 % — SIGNIFICANT CHANGE UP (ref 0–0.9)
IMM GRANULOCYTES NFR BLD AUTO: 0.6 % — SIGNIFICANT CHANGE UP (ref 0–0.9)
LYMPHOCYTES # BLD AUTO: 18.3 % — SIGNIFICANT CHANGE UP (ref 13–44)
LYMPHOCYTES # BLD AUTO: 18.3 % — SIGNIFICANT CHANGE UP (ref 13–44)
LYMPHOCYTES # BLD AUTO: 3.75 K/UL — HIGH (ref 1–3.3)
LYMPHOCYTES # BLD AUTO: 3.75 K/UL — HIGH (ref 1–3.3)
MCHC RBC-ENTMCNC: 29.5 PG — SIGNIFICANT CHANGE UP (ref 27–34)
MCHC RBC-ENTMCNC: 29.5 PG — SIGNIFICANT CHANGE UP (ref 27–34)
MCHC RBC-ENTMCNC: 32.6 GM/DL — SIGNIFICANT CHANGE UP (ref 32–36)
MCHC RBC-ENTMCNC: 32.6 GM/DL — SIGNIFICANT CHANGE UP (ref 32–36)
MCV RBC AUTO: 90.4 FL — SIGNIFICANT CHANGE UP (ref 80–100)
MCV RBC AUTO: 90.4 FL — SIGNIFICANT CHANGE UP (ref 80–100)
MONOCYTES # BLD AUTO: 1.15 K/UL — HIGH (ref 0–0.9)
MONOCYTES # BLD AUTO: 1.15 K/UL — HIGH (ref 0–0.9)
MONOCYTES NFR BLD AUTO: 5.6 % — SIGNIFICANT CHANGE UP (ref 2–14)
MONOCYTES NFR BLD AUTO: 5.6 % — SIGNIFICANT CHANGE UP (ref 2–14)
NEUTROPHILS # BLD AUTO: 15.12 K/UL — HIGH (ref 1.8–7.4)
NEUTROPHILS # BLD AUTO: 15.12 K/UL — HIGH (ref 1.8–7.4)
NEUTROPHILS NFR BLD AUTO: 73.9 % — SIGNIFICANT CHANGE UP (ref 43–77)
NEUTROPHILS NFR BLD AUTO: 73.9 % — SIGNIFICANT CHANGE UP (ref 43–77)
NRBC # BLD: 0 /100 WBCS — SIGNIFICANT CHANGE UP (ref 0–0)
NRBC # BLD: 0 /100 WBCS — SIGNIFICANT CHANGE UP (ref 0–0)
NRBC # FLD: 0 K/UL — SIGNIFICANT CHANGE UP (ref 0–0)
NRBC # FLD: 0 K/UL — SIGNIFICANT CHANGE UP (ref 0–0)
PLATELET # BLD AUTO: 214 K/UL — SIGNIFICANT CHANGE UP (ref 150–400)
PLATELET # BLD AUTO: 214 K/UL — SIGNIFICANT CHANGE UP (ref 150–400)
RBC # BLD: 3.63 M/UL — LOW (ref 3.8–5.2)
RBC # BLD: 3.63 M/UL — LOW (ref 3.8–5.2)
RBC # FLD: 13 % — SIGNIFICANT CHANGE UP (ref 10.3–14.5)
RBC # FLD: 13 % — SIGNIFICANT CHANGE UP (ref 10.3–14.5)
WBC # BLD: 20.46 K/UL — HIGH (ref 3.8–10.5)
WBC # BLD: 20.46 K/UL — HIGH (ref 3.8–10.5)
WBC # FLD AUTO: 20.46 K/UL — HIGH (ref 3.8–10.5)
WBC # FLD AUTO: 20.46 K/UL — HIGH (ref 3.8–10.5)

## 2024-01-11 RX ADMIN — Medication 975 MILLIGRAM(S): at 20:24

## 2024-01-11 RX ADMIN — Medication 600 MILLIGRAM(S): at 12:40

## 2024-01-11 RX ADMIN — SODIUM CHLORIDE 3 MILLILITER(S): 9 INJECTION INTRAMUSCULAR; INTRAVENOUS; SUBCUTANEOUS at 16:10

## 2024-01-11 RX ADMIN — Medication 600 MILLIGRAM(S): at 17:22

## 2024-01-11 RX ADMIN — Medication 975 MILLIGRAM(S): at 20:54

## 2024-01-11 RX ADMIN — Medication 975 MILLIGRAM(S): at 09:54

## 2024-01-11 RX ADMIN — Medication 600 MILLIGRAM(S): at 18:09

## 2024-01-11 RX ADMIN — Medication 975 MILLIGRAM(S): at 04:23

## 2024-01-11 RX ADMIN — Medication 975 MILLIGRAM(S): at 03:54

## 2024-01-11 RX ADMIN — Medication 600 MILLIGRAM(S): at 11:51

## 2024-01-11 RX ADMIN — Medication 975 MILLIGRAM(S): at 10:40

## 2024-01-11 RX ADMIN — Medication 1 TABLET(S): at 11:51

## 2024-01-11 RX ADMIN — SODIUM CHLORIDE 3 MILLILITER(S): 9 INJECTION INTRAMUSCULAR; INTRAVENOUS; SUBCUTANEOUS at 22:44

## 2024-01-11 NOTE — LACTATION INITIAL EVALUATION - INTERVENTION OUTCOME
Discussed  prevention  and  treatment of  sore nipples using colostrum care and/or lanolin. Mother and Infant roomed-in.   Mother educated on safe skin to skin care, safe sleep practices and environment. Call bell within reach./verbalizes understanding/demonstrates understanding of teaching/good return demonstration/needs met

## 2024-01-11 NOTE — PROGRESS NOTE ADULT - ASSESSMENT
Assessment and Plan  PPD #1 s/p VAVD for cat 2 tracing. QBL 425ml. s/p cytotec DC    Leukocytosis#  -WBC 24.4, repeat CBC ordered  -patient asymptomatic  WBC 24.4>>20  Repeat CBC ordered for tomorrow AM  continue to monitor    Doing well postpartum  Bonding with infant  Increase ambulation.  Encourage breastfeeding.  PP & PPD Instructions reviewed.  PP educational materials reviewed & provided     Discharge planning    Discussed with MD Nakul Renteria       Assessment and Plan  PPD #1 s/p VAVD for cat 2 tracing. QBL 425ml. s/p cytotec NV    Leukocytosis#  -WBC 24.4, repeat CBC ordered  -patient asymptomatic  WBC 24.4>>20  Repeat CBC ordered for tomorrow AM  continue to monitor    Doing well postpartum  Bonding with infant  Increase ambulation.  Encourage breastfeeding.  PP & PPD Instructions reviewed.  PP educational materials reviewed & provided     Discharge planning    Discussed with MD Nakul Renteria

## 2024-01-11 NOTE — LACTATION INITIAL EVALUATION - LACTATION INTERVENTIONS
Mom educated about babies less than 24 hours of age will be sleepy. Made aware of cluster feeding that occurs after 24 hours of life and to be cautious of sleep deprivation in order to maintain infant and mother safety. Instructed to place infant in bassinet or call for assistance if feeling sleepy or tired.Recognition of feeding cues and to feed the baby on demand based on cues at least 8-12 times in a day. Instructed pt. to wake the baby to feed if no feeding cues are seen within 3h since prior feed. Pt. educated on the nutritional needs of the baby, how many wet and dirty diapers to expect, along with the amount of times the baby needs to be placed on the breast at this time.  use  feeding log to record feedings along with wet and dirty diapers. instructed in hand expression with good return demonstration.  Reviewed safe skin to skin. Verbalized understanding of education. Encouraged to breastfeed the baby on demand based on cues and at least 8-12 times in a day. Instructed to log feedings along with wet and dirty diapers. Instructed in hand expression with + colostrum noted. Assisted mom with latch and positioning. Encouraged deeper latch.  Nipple does come slight out on Both Breasts, Supplementation risks discussed.  More Motivation given./initiate/review safe skin-to-skin/initiate/review hand expression/initiate/review techniques for position and latch/review techniques to increase milk supply/review techniques to manage sore nipples/engorgement/initiate/review finger suck/initiate/review breast massage/compression/initiate/review alternate feeding method/reviewed components of an effective feeding and at least 8 effective feedings per day required/reviewed importance of monitoring infant diapers, the breastfeeding log, and minimum output each day/reviewed risks of unnecessary formula supplementation/reviewed risks of artificial nipples/reviewed strategies to transition to breastfeeding only/reviewed benefits and recommendations for rooming in/reviewed feeding on demand/by cue at least 8 times a day/recommended follow-up with pediatrician within 24 hours of discharge/reviewed indications of inadequate milk transfer that would require supplementation

## 2024-01-11 NOTE — PROGRESS NOTE ADULT - ATTENDING COMMENTS
Assessment and Plan  PPD #1 s/p VAVD for cat 2 tracing. QBL 425ml. s/p cytotec [place]    Leukocytosis#  -WBC 24.4, repeat CBC ordered  -patient asymptomatic  WBC 24.4>>20  Repeat CBC ordered for tomorrow AM  continue to monitor    Doing well postpartum  Bonding with infant  Increase ambulation.  Encourage breastfeeding.  PP & PPD Instructions reviewed.  PP educational materials reviewed & provided Assessment and Plan  PPD #1 s/p VAVD for cat 2 tracing. QBL 425ml. s/p cytotec [place]    Leukocytosis#  -WBC 24.4, repeat CBC ordered  -patient asymptomatic  WBC 24.4>>20  Repeat CBC ordered for tomorrow AM  continue to monitor    Doing well postpartum  Bonding with infant  Increase ambulation.  Encourage breastfeeding.  PP & PPD Instructions reviewed.  PP educational materials reviewed & provided

## 2024-01-12 VITALS
HEART RATE: 103 BPM | SYSTOLIC BLOOD PRESSURE: 115 MMHG | TEMPERATURE: 98 F | DIASTOLIC BLOOD PRESSURE: 60 MMHG | OXYGEN SATURATION: 100 % | RESPIRATION RATE: 18 BRPM

## 2024-01-12 DIAGNOSIS — Z37.9 OUTCOME OF DELIVERY, UNSPECIFIED: ICD-10-CM

## 2024-01-12 DIAGNOSIS — D62 ACUTE POSTHEMORRHAGIC ANEMIA: ICD-10-CM

## 2024-01-12 LAB
BASOPHILS # BLD AUTO: 0.06 K/UL — SIGNIFICANT CHANGE UP (ref 0–0.2)
BASOPHILS # BLD AUTO: 0.06 K/UL — SIGNIFICANT CHANGE UP (ref 0–0.2)
BASOPHILS NFR BLD AUTO: 0.4 % — SIGNIFICANT CHANGE UP (ref 0–2)
BASOPHILS NFR BLD AUTO: 0.4 % — SIGNIFICANT CHANGE UP (ref 0–2)
EOSINOPHIL # BLD AUTO: 0.42 K/UL — SIGNIFICANT CHANGE UP (ref 0–0.5)
EOSINOPHIL # BLD AUTO: 0.42 K/UL — SIGNIFICANT CHANGE UP (ref 0–0.5)
EOSINOPHIL NFR BLD AUTO: 2.5 % — SIGNIFICANT CHANGE UP (ref 0–6)
EOSINOPHIL NFR BLD AUTO: 2.5 % — SIGNIFICANT CHANGE UP (ref 0–6)
HCT VFR BLD CALC: 33.6 % — LOW (ref 34.5–45)
HCT VFR BLD CALC: 33.6 % — LOW (ref 34.5–45)
HGB BLD-MCNC: 10.7 G/DL — LOW (ref 11.5–15.5)
HGB BLD-MCNC: 10.7 G/DL — LOW (ref 11.5–15.5)
IANC: 10.69 K/UL — HIGH (ref 1.8–7.4)
IANC: 10.69 K/UL — HIGH (ref 1.8–7.4)
IMM GRANULOCYTES NFR BLD AUTO: 0.7 % — SIGNIFICANT CHANGE UP (ref 0–0.9)
IMM GRANULOCYTES NFR BLD AUTO: 0.7 % — SIGNIFICANT CHANGE UP (ref 0–0.9)
LYMPHOCYTES # BLD AUTO: 24.9 % — SIGNIFICANT CHANGE UP (ref 13–44)
LYMPHOCYTES # BLD AUTO: 24.9 % — SIGNIFICANT CHANGE UP (ref 13–44)
LYMPHOCYTES # BLD AUTO: 4.11 K/UL — HIGH (ref 1–3.3)
LYMPHOCYTES # BLD AUTO: 4.11 K/UL — HIGH (ref 1–3.3)
MCHC RBC-ENTMCNC: 29.1 PG — SIGNIFICANT CHANGE UP (ref 27–34)
MCHC RBC-ENTMCNC: 29.1 PG — SIGNIFICANT CHANGE UP (ref 27–34)
MCHC RBC-ENTMCNC: 31.8 GM/DL — LOW (ref 32–36)
MCHC RBC-ENTMCNC: 31.8 GM/DL — LOW (ref 32–36)
MCV RBC AUTO: 91.3 FL — SIGNIFICANT CHANGE UP (ref 80–100)
MCV RBC AUTO: 91.3 FL — SIGNIFICANT CHANGE UP (ref 80–100)
MONOCYTES # BLD AUTO: 1.14 K/UL — HIGH (ref 0–0.9)
MONOCYTES # BLD AUTO: 1.14 K/UL — HIGH (ref 0–0.9)
MONOCYTES NFR BLD AUTO: 6.9 % — SIGNIFICANT CHANGE UP (ref 2–14)
MONOCYTES NFR BLD AUTO: 6.9 % — SIGNIFICANT CHANGE UP (ref 2–14)
NEUTROPHILS # BLD AUTO: 10.69 K/UL — HIGH (ref 1.8–7.4)
NEUTROPHILS # BLD AUTO: 10.69 K/UL — HIGH (ref 1.8–7.4)
NEUTROPHILS NFR BLD AUTO: 64.6 % — SIGNIFICANT CHANGE UP (ref 43–77)
NEUTROPHILS NFR BLD AUTO: 64.6 % — SIGNIFICANT CHANGE UP (ref 43–77)
NRBC # BLD: 0 /100 WBCS — SIGNIFICANT CHANGE UP (ref 0–0)
NRBC # BLD: 0 /100 WBCS — SIGNIFICANT CHANGE UP (ref 0–0)
NRBC # FLD: 0 K/UL — SIGNIFICANT CHANGE UP (ref 0–0)
NRBC # FLD: 0 K/UL — SIGNIFICANT CHANGE UP (ref 0–0)
PLATELET # BLD AUTO: 249 K/UL — SIGNIFICANT CHANGE UP (ref 150–400)
PLATELET # BLD AUTO: 249 K/UL — SIGNIFICANT CHANGE UP (ref 150–400)
RBC # BLD: 3.68 M/UL — LOW (ref 3.8–5.2)
RBC # BLD: 3.68 M/UL — LOW (ref 3.8–5.2)
RBC # FLD: 13 % — SIGNIFICANT CHANGE UP (ref 10.3–14.5)
RBC # FLD: 13 % — SIGNIFICANT CHANGE UP (ref 10.3–14.5)
WBC # BLD: 16.53 K/UL — HIGH (ref 3.8–10.5)
WBC # BLD: 16.53 K/UL — HIGH (ref 3.8–10.5)
WBC # FLD AUTO: 16.53 K/UL — HIGH (ref 3.8–10.5)
WBC # FLD AUTO: 16.53 K/UL — HIGH (ref 3.8–10.5)

## 2024-01-12 RX ADMIN — Medication 975 MILLIGRAM(S): at 09:10

## 2024-01-12 RX ADMIN — SODIUM CHLORIDE 3 MILLILITER(S): 9 INJECTION INTRAMUSCULAR; INTRAVENOUS; SUBCUTANEOUS at 06:31

## 2024-01-12 RX ADMIN — Medication 600 MILLIGRAM(S): at 06:02

## 2024-01-12 RX ADMIN — Medication 600 MILLIGRAM(S): at 12:20

## 2024-01-12 RX ADMIN — Medication 975 MILLIGRAM(S): at 08:22

## 2024-01-12 RX ADMIN — Medication 600 MILLIGRAM(S): at 05:32

## 2024-01-12 RX ADMIN — Medication 600 MILLIGRAM(S): at 11:33

## 2024-01-12 RX ADMIN — Medication 1 TABLET(S): at 11:33

## 2024-01-12 NOTE — PROGRESS NOTE ADULT - SUBJECTIVE AND OBJECTIVE BOX
Post-partum Note, ANNETTE  She is a  33y woman who is now post-partum day: 1    Subjective:  The patient feels well.  She is ambulating.   She is tolerating regular diet.  She denies nausea and vomiting; denies fever.  She is voiding.  Her pain is controlled.  She reports normal postpartum bleeding.  She is breastfeeding.  She is formula feeding.  She is bonding with infant.    Physical exam:    Vital Signs Last 24 Hrs  T(C): 36.8 (11 Jan 2024 10:05), Max: 36.9 (10 Jacoby 2024 11:40)  T(F): 98.2 (11 Jan 2024 10:05), Max: 98.4 (10 Jacoby 2024 11:40)  HR: 81 (11 Jan 2024 10:05) (81 - 93)  BP: 104/61 (11 Jan 2024 10:05) (93/53 - 104/61)  BP(mean): --  RR: 18 (11 Jan 2024 10:05) (17 - 18)  SpO2: 100% (11 Jan 2024 10:05) (98% - 100%)    Parameters below as of 11 Jan 2024 10:05  Patient On (Oxygen Delivery Method): room air        Gen: NAD  Breast: Soft, nontender, not engorged.  Abdomen: Soft, nontender, no distension , firm uterine fundus at umbilicus.  Pelvic: Normal lochia noted  Ext: No calf tenderness    LABS:                        10.7   20.46 )-----------( 214      ( 11 Jan 2024 08:55 )             32.8       Rubella status:     Allergies    No Known Allergies    Intolerances      MEDICATIONS  (STANDING):  acetaminophen     Tablet .. 975 milliGRAM(s) Oral <User Schedule>  diphtheria/tetanus/pertussis (acellular) Vaccine (Adacel) 0.5 milliLiter(s) IntraMuscular once  ibuprofen  Tablet. 600 milliGRAM(s) Oral every 6 hours  oxytocin Infusion 41.667 milliUNIT(s)/Min (125 mL/Hr) IV Continuous <Continuous>  oxytocin Infusion. 2 milliUNIT(s)/Min (2 mL/Hr) IV Continuous <Continuous>  prenatal multivitamin 1 Tablet(s) Oral daily  sodium chloride 0.9% lock flush 3 milliLiter(s) IV Push every 8 hours  sodium chloride 0.9%. 1000 milliLiter(s) (125 mL/Hr) IV Continuous <Continuous>    MEDICATIONS  (PRN):  benzocaine 20%/menthol 0.5% Spray 1 Spray(s) Topical every 6 hours PRN for Perineal discomfort  dibucaine 1% Ointment 1 Application(s) Topical every 6 hours PRN Perineal discomfort  diphenhydrAMINE 25 milliGRAM(s) Oral every 6 hours PRN Pruritus  hydrocortisone 1% Cream 1 Application(s) Topical every 6 hours PRN Moderate Pain (4-6)  lanolin Ointment 1 Application(s) Topical every 6 hours PRN nipple soreness  magnesium hydroxide Suspension 30 milliLiter(s) Oral two times a day PRN Constipation  oxyCODONE    IR 5 milliGRAM(s) Oral once PRN Moderate to Severe Pain (4-10)  oxyCODONE    IR 5 milliGRAM(s) Oral every 3 hours PRN Moderate to Severe Pain (4-10)  pramoxine 1%/zinc 5% Cream 1 Application(s) Topical every 4 hours PRN Moderate Pain (4-6)  simethicone 80 milliGRAM(s) Chew every 4 hours PRN Gas  witch hazel Pads 1 Application(s) Topical every 4 hours PRN Perineal discomfort        
Post-partum Note, ANNETTE  She is a  33y woman who is now post-partum day: 2    Subjective:  The patient feels well.  She is ambulating.   She is tolerating regular diet.  She denies nausea and vomiting; denies fever.  She is voiding.  Her pain is controlled.  She reports normal postpartum bleeding.  She is breastfeeding.  She denies dizziness, lightheadedness, SOB, CP.    Physical exam:    Vital Signs Last 24 Hrs  T(C): 36.7 (12 Jan 2024 05:02), Max: 36.7 (12 Jan 2024 05:02)  T(F): 98 (12 Jan 2024 05:02), Max: 98 (12 Jan 2024 05:02)  HR: 87 (12 Jan 2024 05:02) (86 - 87)  BP: 106/62 (12 Jan 2024 05:02) (102/54 - 106/62)  BP(mean): --  RR: 18 (12 Jan 2024 05:02) (18 - 18)  SpO2: 99% (12 Jan 2024 05:02) (99% - 99%)    Parameters below as of 12 Jan 2024 05:02  Patient On (Oxygen Delivery Method): room air        Gen: NAD  Breast: Soft, nontender, not engorged.  Abdomen: Soft, nontender, no distension , firm uterine fundus at umbilicus.  Pelvic: Normal lochia noted  Ext: No calf tenderness    LABS:                        10.7   16.53 )-----------( 249      ( 12 Jan 2024 04:43 )             33.6         Allergies    No Known Allergies      MEDICATIONS  (STANDING):  acetaminophen     Tablet .. 975 milliGRAM(s) Oral <User Schedule>  diphtheria/tetanus/pertussis (acellular) Vaccine (Adacel) 0.5 milliLiter(s) IntraMuscular once  ibuprofen  Tablet. 600 milliGRAM(s) Oral every 6 hours  oxytocin Infusion 41.667 milliUNIT(s)/Min (125 mL/Hr) IV Continuous <Continuous>  oxytocin Infusion. 2 milliUNIT(s)/Min (2 mL/Hr) IV Continuous <Continuous>  prenatal multivitamin 1 Tablet(s) Oral daily  sodium chloride 0.9% lock flush 3 milliLiter(s) IV Push every 8 hours  sodium chloride 0.9%. 1000 milliLiter(s) (125 mL/Hr) IV Continuous <Continuous>    MEDICATIONS  (PRN):  benzocaine 20%/menthol 0.5% Spray 1 Spray(s) Topical every 6 hours PRN for Perineal discomfort  dibucaine 1% Ointment 1 Application(s) Topical every 6 hours PRN Perineal discomfort  diphenhydrAMINE 25 milliGRAM(s) Oral every 6 hours PRN Pruritus  hydrocortisone 1% Cream 1 Application(s) Topical every 6 hours PRN Moderate Pain (4-6)  lanolin Ointment 1 Application(s) Topical every 6 hours PRN nipple soreness  magnesium hydroxide Suspension 30 milliLiter(s) Oral two times a day PRN Constipation  oxyCODONE    IR 5 milliGRAM(s) Oral once PRN Moderate to Severe Pain (4-10)  oxyCODONE    IR 5 milliGRAM(s) Oral every 3 hours PRN Moderate to Severe Pain (4-10)  pramoxine 1%/zinc 5% Cream 1 Application(s) Topical every 4 hours PRN Moderate Pain (4-6)  simethicone 80 milliGRAM(s) Chew every 4 hours PRN Gas  witch hazel Pads 1 Application(s) Topical every 4 hours PRN Perineal discomfort

## 2024-01-12 NOTE — PROGRESS NOTE ADULT - ASSESSMENT
Assessment and Plan  PPD #2 s/p VAVD  Doing well, bonding with baby, support at bedside  Acute blood loss anemia  ·	increase intake of iron rich foods  ·	increase po hydration  ·	fall precautions  ·	bleeding precautions  Encourage ambulation.  PP & PPD Instructions reviewed.  CPC.  D/C home today  RTO 6 weeks for routine postpartum visit/PRN
